# Patient Record
Sex: MALE | Race: WHITE
[De-identification: names, ages, dates, MRNs, and addresses within clinical notes are randomized per-mention and may not be internally consistent; named-entity substitution may affect disease eponyms.]

---

## 2018-12-25 ENCOUNTER — HOSPITAL ENCOUNTER (EMERGENCY)
Dept: HOSPITAL 26 - MED | Age: 43
Discharge: HOME | End: 2018-12-25
Payer: COMMERCIAL

## 2018-12-25 VITALS — DIASTOLIC BLOOD PRESSURE: 78 MMHG | SYSTOLIC BLOOD PRESSURE: 134 MMHG

## 2018-12-25 VITALS — SYSTOLIC BLOOD PRESSURE: 131 MMHG | DIASTOLIC BLOOD PRESSURE: 76 MMHG

## 2018-12-25 VITALS — BODY MASS INDEX: 34.65 KG/M2 | HEIGHT: 74 IN | WEIGHT: 270 LBS
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
## 2018-12-25 DIAGNOSIS — S93.402A: Primary | ICD-10-CM
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
## 2018-12-25 PROCEDURE — 73610 X-RAY EXAM OF ANKLE: CPT

## 2018-12-25 PROCEDURE — 99283 EMERGENCY DEPT VISIT LOW MDM: CPT

## 2018-12-25 PROCEDURE — 29515 APPLICATION SHORT LEG SPLINT: CPT

## 2018-12-25 RX ADMIN — KETOROLAC TROMETHAMINE ONE MG: 60 INJECTION, SOLUTION INTRAMUSCULAR at 18:43

## 2019-02-25 ENCOUNTER — HOSPITAL ENCOUNTER (EMERGENCY)
Dept: HOSPITAL 26 - MED | Age: 44
Discharge: HOME | End: 2019-02-25
Payer: COMMERCIAL

## 2019-02-25 VITALS — DIASTOLIC BLOOD PRESSURE: 87 MMHG | SYSTOLIC BLOOD PRESSURE: 119 MMHG

## 2019-02-25 VITALS — SYSTOLIC BLOOD PRESSURE: 123 MMHG | DIASTOLIC BLOOD PRESSURE: 88 MMHG

## 2019-02-25 VITALS — HEIGHT: 72 IN | WEIGHT: 280 LBS | BODY MASS INDEX: 37.93 KG/M2

## 2019-02-25 DIAGNOSIS — H65.92: ICD-10-CM

## 2019-02-25 DIAGNOSIS — Z90.49: ICD-10-CM

## 2019-02-25 DIAGNOSIS — J02.9: Primary | ICD-10-CM

## 2019-02-25 NOTE — NUR
AAO X4 43 YR OLD M WITH C/O SORE THROAT WITH DIFFICULTY SWALLOWING 6/ 10 AND 
DRY NON PRODUCTIVE COUGH X 6 WKS, LT EAR PAIN 8/10 HEAD ACHE 7/10 X 1 WK WITH 
DIZZINESS.



- SOB, ACCESSORY MUSCLE USE, SPEAKS FULL SENTENCES, SPO2 98% ON ROOM AIR. VSS; 
PATIENT POSITIONED FOR COMFORT; HOB ELEVATED; BEDRAILS UP X2; BED DOWN.

## 2019-02-25 NOTE — NUR
Patient discharged with v/s stable. Written and verbal after care instructions 
given and explained. 

Patient alert, oriented and verbalized understanding of instructions. 
Ambulatory with steady gait. All questions addressed prior to discharge. ID 
band removed. Patient advised to follow up with PMD. Rx of LORATADINE 10MG 
given. Patient educated on indication of medication including possible reaction 
and side effects. Opportunity to ask questions provided and answered.